# Patient Record
Sex: MALE | Race: WHITE | Employment: UNEMPLOYED | URBAN - METROPOLITAN AREA
[De-identification: names, ages, dates, MRNs, and addresses within clinical notes are randomized per-mention and may not be internally consistent; named-entity substitution may affect disease eponyms.]

---

## 2017-08-24 ENCOUNTER — HOSPITAL ENCOUNTER (EMERGENCY)
Facility: CLINIC | Age: 22
Discharge: HOME | End: 2017-08-25
Attending: EMERGENCY MEDICINE | Admitting: EMERGENCY MEDICINE
Payer: COMMERCIAL

## 2017-08-24 DIAGNOSIS — R19.7 BLOODY DIARRHEA: ICD-10-CM

## 2017-08-24 LAB
ANION GAP SERPL CALCULATED.3IONS-SCNC: 8 MMOL/L (ref 3–14)
BASOPHILS # BLD AUTO: 0 10E9/L (ref 0–0.2)
BASOPHILS NFR BLD AUTO: 0.4 %
BUN SERPL-MCNC: 10 MG/DL (ref 7–30)
CALCIUM SERPL-MCNC: 9.1 MG/DL (ref 8.5–10.1)
CHLORIDE SERPL-SCNC: 103 MMOL/L (ref 94–109)
CO2 SERPL-SCNC: 27 MMOL/L (ref 20–32)
CREAT SERPL-MCNC: 0.69 MG/DL (ref 0.66–1.25)
DIFFERENTIAL METHOD BLD: NORMAL
EOSINOPHIL # BLD AUTO: 0.4 10E9/L (ref 0–0.7)
EOSINOPHIL NFR BLD AUTO: 4.9 %
ERYTHROCYTE [DISTWIDTH] IN BLOOD BY AUTOMATED COUNT: 12 % (ref 10–15)
GFR SERPL CREATININE-BSD FRML MDRD: >90 ML/MIN/1.7M2
GLUCOSE SERPL-MCNC: 80 MG/DL (ref 70–99)
HCT VFR BLD AUTO: 42.6 % (ref 40–53)
HGB BLD-MCNC: 14.9 G/DL (ref 13.3–17.7)
IMM GRANULOCYTES # BLD: 0 10E9/L (ref 0–0.4)
IMM GRANULOCYTES NFR BLD: 0.3 %
LIPASE SERPL-CCNC: 330 U/L (ref 73–393)
LYMPHOCYTES # BLD AUTO: 2.2 10E9/L (ref 0.8–5.3)
LYMPHOCYTES NFR BLD AUTO: 29.7 %
MCH RBC QN AUTO: 30.2 PG (ref 26.5–33)
MCHC RBC AUTO-ENTMCNC: 35 G/DL (ref 31.5–36.5)
MCV RBC AUTO: 86 FL (ref 78–100)
MONOCYTES # BLD AUTO: 1 10E9/L (ref 0–1.3)
MONOCYTES NFR BLD AUTO: 13.1 %
NEUTROPHILS # BLD AUTO: 3.8 10E9/L (ref 1.6–8.3)
NEUTROPHILS NFR BLD AUTO: 51.6 %
NRBC # BLD AUTO: 0 10*3/UL
NRBC BLD AUTO-RTO: 0 /100
PLATELET # BLD AUTO: 198 10E9/L (ref 150–450)
POTASSIUM SERPL-SCNC: 3.7 MMOL/L (ref 3.4–5.3)
RBC # BLD AUTO: 4.94 10E12/L (ref 4.4–5.9)
SODIUM SERPL-SCNC: 138 MMOL/L (ref 133–144)
WBC # BLD AUTO: 7.4 10E9/L (ref 4–11)

## 2017-08-24 PROCEDURE — 83690 ASSAY OF LIPASE: CPT | Performed by: EMERGENCY MEDICINE

## 2017-08-24 PROCEDURE — 99284 EMERGENCY DEPT VISIT MOD MDM: CPT | Mod: 25

## 2017-08-24 PROCEDURE — 85025 COMPLETE CBC W/AUTO DIFF WBC: CPT | Performed by: EMERGENCY MEDICINE

## 2017-08-24 PROCEDURE — 96360 HYDRATION IV INFUSION INIT: CPT

## 2017-08-24 PROCEDURE — 25000128 H RX IP 250 OP 636: Performed by: EMERGENCY MEDICINE

## 2017-08-24 PROCEDURE — 80048 BASIC METABOLIC PNL TOTAL CA: CPT | Performed by: EMERGENCY MEDICINE

## 2017-08-24 RX ORDER — SODIUM CHLORIDE 9 MG/ML
1000 INJECTION, SOLUTION INTRAVENOUS CONTINUOUS
Status: DISCONTINUED | OUTPATIENT
Start: 2017-08-24 | End: 2017-08-25 | Stop reason: HOSPADM

## 2017-08-24 RX ADMIN — SODIUM CHLORIDE 1000 ML: 9 INJECTION, SOLUTION INTRAVENOUS at 23:15

## 2017-08-24 NOTE — ED AVS SNAPSHOT
Emergency Department    [address] [place] [address]    Phone:  [phone]    Fax:  [phone]                                       Awais Best   MRN: [id number]    Department:   Emergency Department   Date of Visit:  8/24/2017           After Visit Summary Signature Page     I have received my discharge instructions, and my questions have been answered. I have discussed any challenges I see with this plan with the nurse or doctor.    ..........................................................................................................................................  Patient/Patient Representative Signature      ..........................................................................................................................................  Patient Representative Print Name and Relationship to Patient    ..................................................               ................................................  Date                                            Time    ..........................................................................................................................................  Reviewed by Signature/Title    ...................................................              ..............................................  Date                                                            Time

## 2017-08-24 NOTE — ED AVS SNAPSHOT
Emergency Department    [address] [facility] [address]    Phone:  [phone]    Fax:  [phone]                                       Awais Best   MRN: [id number]    Department:   Emergency Department   Date of Visit:  8/24/2017           Patient Information     Date Of Birth          1995        Your diagnoses for this visit were:     Bloody diarrhea        You were seen by Harris Benson MD.      Follow-up Information     Follow up with [facility]. Call today.    Specialty:  Pediatrics    Contact information:    [address] [place]  [facility] [address] [phone]          Follow up with  Emergency Department.    Specialty:  EMERGENCY MEDICINE    Why:  As needed    Contact information:    [address] [facility] [address] [phone]        Discharge Instructions       1. Drink plenty of fluids.  2. Please call your clinic tomorrow to check on your culture results.  3. Please follow-up with your primary care doctor as needed.  Please be seen in [facility] next week if symptoms persist.  4. Please return to the ED as needed for new or worsening symptoms such as severe and uncontrollable abdominal pain, vomiting and unable to keep anything down, fainting, chest pain, shortness of breath, any other concerning symptoms.  Please avoid anti-diarrheal medications.    24 Hour Appointment Hotline       To make an appointment at any [facility], call [phone] ([phone]). If you don't have a family doctor or clinic, we will help you find one. [place] clinics are conveniently located to serve the needs of you and your family.             Review of your medicines      Notice     You have not been prescribed any medications.            Procedures and tests performed during your visit     Basic metabolic panel    CBC with platelets differential    Enteric Bacteria and Virus Panel by GERONIMO Stool    Lipase    Ova and  Parasite Exam Routine      Orders Needing Specimen Collection     None      Pending Results     Date and Time Order Name Status Description    8/24/2017 2303 Ova and Parasite Exam Routine In process     8/24/2017 2303 Enteric Bacteria and Virus Panel by GERONIMO Stool In process             Pending Culture Results     Date and Time Order Name Status Description    8/24/2017 2303 Ova and Parasite Exam Routine In process     8/24/2017 2303 Enteric Bacteria and Virus Panel by GERONIMO Stool In process             Pending Results Instructions     If you had any lab results that were not finalized at the time of your Discharge, you can call the ED Lab Result RN at [phone]. You will be contacted by this team for any positive Lab results or changes in treatment. The nurses are available 7 days a week from 10A to 6:30P.  You can leave a message 24 hours per day and they will return your call.        Test Results From Your Hospital Stay        8/24/2017 10:26 PM      Component Results     Component Value Ref Range & Units Status    WBC 7.4 4.0 - 11.0 10e9/L Final    RBC Count 4.94 4.4 - 5.9 10e12/L Final    Hemoglobin 14.9 13.3 - 17.7 g/dL Final    Hematocrit 42.6 40.0 - 53.0 % Final    MCV 86 78 - 100 fl Final    MCH 30.2 26.5 - 33.0 pg Final    MCHC 35.0 31.5 - 36.5 g/dL Final    RDW 12.0 10.0 - 15.0 % Final    Platelet Count 198 150 - 450 10e9/L Final    Diff Method Automated Method  Final    % Neutrophils 51.6 % Final    % Lymphocytes 29.7 % Final    % Monocytes 13.1 % Final    % Eosinophils 4.9 % Final    % Basophils 0.4 % Final    % Immature Granulocytes 0.3 % Final    Nucleated RBCs 0 0 /100 Final    Absolute Neutrophil 3.8 1.6 - 8.3 10e9/L Final    Absolute Lymphocytes 2.2 0.8 - 5.3 10e9/L Final    Absolute Monocytes 1.0 0.0 - 1.3 10e9/L Final    Absolute Eosinophils 0.4 0.0 - 0.7 10e9/L Final    Absolute Basophils 0.0 0.0 - 0.2 10e9/L Final    Abs Immature Granulocytes 0.0 0 - 0.4 10e9/L Final    Absolute Nucleated RBC  0.0  Final         8/24/2017 10:42 PM      Component Results     Component Value Ref Range & Units Status    Lipase 330 73 - 393 U/L Final         8/24/2017 10:42 PM      Component Results     Component Value Ref Range & Units Status    Sodium 138 133 - 144 mmol/L Final    Potassium 3.7 3.4 - 5.3 mmol/L Final    Chloride 103 94 - 109 mmol/L Final    Carbon Dioxide 27 20 - 32 mmol/L Final    Anion Gap 8 3 - 14 mmol/L Final    Glucose 80 70 - 99 mg/dL Final    Urea Nitrogen 10 7 - 30 mg/dL Final    Creatinine 0.69 0.66 - 1.25 mg/dL Final    GFR Estimate >90 >60 mL/min/1.7m2 Final    Non  GFR Calc    GFR Estimate If Black >90 >60 mL/min/1.7m2 Final    African American GFR Calc    Calcium 9.1 8.5 - 10.1 mg/dL Final         8/25/2017 12:29 AM         8/25/2017 12:30 AM                Clinical Quality Measure: Blood Pressure Screening     Your blood pressure was checked while you were in the emergency department today. The last reading we obtained was  BP: 112/71 . Please read the guidelines below about what these numbers mean and what you should do about them.  If your systolic blood pressure (the top number) is less than 120 and your diastolic blood pressure (the bottom number) is less than 80, then your blood pressure is normal. There is nothing more that you need to do about it.  If your systolic blood pressure (the top number) is 120-139 or your diastolic blood pressure (the bottom number) is 80-89, your blood pressure may be higher than it should be. You should have your blood pressure rechecked within a year by a primary care provider.  If your systolic blood pressure (the top number) is 140 or greater or your diastolic blood pressure (the bottom number) is 90 or greater, you may have high blood pressure. High blood pressure is treatable, but if left untreated over time it can put you at risk for heart attack, stroke, or kidney failure. You should have your blood pressure rechecked by a primary care  "provider within the next 4 weeks.  If your provider in the emergency department today gave you specific instructions to follow-up with your doctor or provider even sooner than that, you should follow that instruction and not wait for up to 4 weeks for your follow-up visit.        Thank you for choosing [facility]       Thank you for choosing [facility] for your care. Our goal is always to provide you with excellent care. Hearing back from our patients is one way we can continue to improve our services. Please take a few minutes to complete the written survey that you may receive in the mail after you visit with us. Thank you!        [employer] Information     [employer] lets you send messages to your doctor, view your test results, renew your prescriptions, schedule appointments and more. To sign up, go to [email or web address] . Click on \"Log in\" on the left side of the screen, which will take you to the Welcome page. Then click on \"Sign up Now\" on the right side of the page.     You will be asked to enter the access code listed below, as well as some personal information. Please follow the directions to create your username and password.     Your access code is: [email or web address]  Expires: 2017 12:37 AM     Your access code will  in 90 days. If you need help or a new code, please call your [facility] clinic or [phone].        Care EveryWhere ID     This is your Care EveryWhere ID. This could be used by other organizations to access your [facility] medical records  [id number]        Equal Access to Services     JOLENE LIVINGSTON : Hadii joselyn youngo Sojesse, waaxda luqadaha, qaybta kaalmada sarah garcia . So [facility] [place] [phone].    ATENCIÓN: Si habla español, tiene a salazar disposición servicios gratuitos de asistencia lingüística. Llame al [phone].    We comply with applicable federal civil rights laws and [place] laws. We do not discriminate on the basis of race, color, " national origin, age, disability sex, sexual orientation or gender identity.            After Visit Summary       This is your record. Keep this with you and show to your community pharmacist(s) and doctor(s) at your next visit.

## 2017-08-25 ENCOUNTER — TELEPHONE (OUTPATIENT)
Dept: EMERGENCY MEDICINE | Facility: CLINIC | Age: 22
End: 2017-08-25

## 2017-08-25 VITALS
OXYGEN SATURATION: 97 % | RESPIRATION RATE: 16 BRPM | SYSTOLIC BLOOD PRESSURE: 112 MMHG | HEIGHT: 74 IN | DIASTOLIC BLOOD PRESSURE: 67 MMHG | WEIGHT: 160 LBS | HEART RATE: 70 BPM | TEMPERATURE: 98.5 F | BODY MASS INDEX: 20.53 KG/M2

## 2017-08-25 LAB
C COLI+JEJUNI+LARI FUSA STL QL NAA+PROBE: ABNORMAL
EC STX1 GENE STL QL NAA+PROBE: NOT DETECTED
EC STX2 GENE STL QL NAA+PROBE: NOT DETECTED
ENTERIC PATHOGEN COMMENT: ABNORMAL
NOROV GI+II ORF1-ORF2 JNC STL QL NAA+PR: NOT DETECTED
O+P STL MICRO: NORMAL
RVA NSP5 STL QL NAA+PROBE: NOT DETECTED
SALMONELLA SP RPOD STL QL NAA+PROBE: NOT DETECTED
SHIGELLA SP+EIEC IPAH STL QL NAA+PROBE: NOT DETECTED
SPECIMEN SOURCE: NORMAL
V CHOL+PARA RFBL+TRKH+TNAA STL QL NAA+PR: NOT DETECTED
Y ENTERO RECN STL QL NAA+PROBE: NOT DETECTED

## 2017-08-25 PROCEDURE — 87506 IADNA-DNA/RNA PROBE TQ 6-11: CPT | Performed by: EMERGENCY MEDICINE

## 2017-08-25 PROCEDURE — 87177 OVA AND PARASITES SMEARS: CPT | Performed by: EMERGENCY MEDICINE

## 2017-08-25 PROCEDURE — 87209 SMEAR COMPLEX STAIN: CPT | Performed by: EMERGENCY MEDICINE

## 2017-08-25 NOTE — DISCHARGE INSTRUCTIONS
1. Drink plenty of fluids.  2. Please call your clinic tomorrow to check on your culture results.  3. Please follow-up with your primary care doctor as needed.  Please be seen in [facility] at [facility] next week if symptoms persist.  4. Please return to the ED as needed for new or worsening symptoms such as severe and uncontrollable abdominal pain, vomiting and unable to keep anything down, fainting, chest pain, shortness of breath, any other concerning symptoms.  Please avoid anti-diarrheal medications.

## 2017-08-25 NOTE — ED NOTES
Pt presents to ER for abdominal pain, diarrhea for 5 days. Pt also states he has nausea but no vomiting. Pt just visited [place]. Lungs clear, abdomen soft and tender RUQ, skin warm dry and intact. Aox4. nad noted. Abc intact. 18g inserted to RAC. Blood cultures held, labs sent.

## 2017-08-25 NOTE — ED PROVIDER NOTES
"  History     Chief Complaint:  Bloody stool    HPI   Awais Best is a 22 year old male who presents with concerns for fever and diarrhea. The past month and a half the patient was living in [place]. A week ago the patient began to feel queezy and off before his return flight. During his flight he felt like something was tearing him up inside. This past week he has had a fever (highest 102), diarrhea and body aches/pains in his legs and knees. The past few days, the patient has had 5-6 episodes of diarrhea per day with a notable amount of blood in his stool which prompted his ED visit. He sent multiple stool samples to [facility] pediatrics on Monday (4 days ago) and is awaiting results. His last BM was 1.5 hours prior to evaluation. The patient denies vomiting and is not nauseous in the ED. He has no health problems, no chest pain, no light headedness, no vision changes, no shortness of breath and no allergies. He is currently on a course of doxycycline for malaria.    Allergies:  No Known Drug Allergies      Medications:    The patient is not currently taking any prescribed medications.    Past Medical History:    depression    Past Surgical History:    History reviewed. No pertinent past surgical history.    Family History:    The patient denies any relevant family medical history.    Social History:  Smoking Status: 0.25 pack/day  Smokeless Tobacco: none  Alcohol Use: yes    Marital Status:  Single [1]    Review of Systems   All other systems reviewed and are negative.    Physical Exam   First Vitals:  BP: 128/70  Pulse: 89  Temp: 98.5  F (36.9  C)  Resp: 16  Height: 188 cm (6' 2\")  Weight: 72.6 kg (160 lb)  SpO2: 100 %    Physical Exam  Constitutional: Well developed, nontox appearance  Head: Atraumatic.   Mouth/Throat: Oropharynx is clear and moist.   Neck:  no stridor  Eyes:  no scleral icterus  Cardiovascular: RRR, no m/r/g, 2+ R distal pulses  Pulmonary/Chest: nml resp effort, Clear BS bilat  Abdominal: " ND, +BS, soft, R mid and lower abd tenderness, no rebound or guarding   : no CVA tenderness bilat  Ext: WWP, no edema  Neurological: A&Ox3, symmetric facies, moves ext x4  Skin: Skin is warm and dry.   Psychiatric: Behavior is normal. Thought content normal.   Nursing note and vitals reviewed.    Emergency Department Course   Laboratory:  CBC: o/w WNL. (WBC 7.4, HGB 14.9, )   Lipase: 330  BMP: o/w WNL (Creatinine: 0.69, Glucose: 80)  Enteric Bacteria and Virus Panel by GERONIMO stool: in process  Ova and parasite exam routine: in process    Interventions:  2315 NS, 1 L, IV    Emergency Department Course:  Nursing notes and vitals reviewed. I performed an exam of the patient as documented above.     IV inserted. Medicine administered as documented above. Blood drawn. This was sent to the lab for further testing, results above.    0004 I consulted with EDDIE [facility], regarding the patient's history and presentation here in the emergency department.    Findings and plan explained to the Patient. Patient discharged home with instructions regarding supportive care, medications, and reasons to return. The importance of close follow-up was reviewed.     I personally reviewed the laboratory results with the Patient and answered all related questions prior to discharge.     Impression & Plan    Medical Decision Makin y/o male presenting with bloody diarrhea.     Differential diagnosis includes travelers diarrhea, electrolyte abnormality, anemia. The labs return unremarkable and the patient non-toxic appearing. Given that the patient has pending culture results and appears well, I feel that deferring antibiotics at this time given risk of E. Coli and complications such as HUS would be the most reasonable route. I also discussed with on-call infectious disease who agreed with the plan. The patient is advised to stay hydrated and follow up with his PCP for culture results. Recommendations also given regarding follow  up in an ED as needed. The patient demonstrated understanding and is agreeable to plan. Discharged in stable condition.    Diagnosis:    ICD-10-CM    1. Bloody diarrhea R19.7 Ova and Parasite Exam Routine       Disposition:  discharged to home    ITariq, am serving as a scribe on 8/24/2017 at 10:59 PM to personally document services performed by Harris Benson MD based on my observations and the provider's statements to me.       Tariq Nieves  8/24/2017    EMERGENCY DEPARTMENT       Harris Benson MD  08/25/17 [id number]

## 2017-08-25 NOTE — TELEPHONE ENCOUNTER
[facility] Emergency Department Lab result notification [Adult-Male]    [facility] ED lab result protocol used  Enteric bacteria and virus / Campylobacter protocol    Reason for call  Notify of lab results, assess symptoms,  review ED providers recommendations/discharge instructions (if necessary) and advise per ED lab result f/u protocol    Lab Result (including Rx patient on, if applicable)  Final Enteric Bacteria and Virus Panel by GERONIMO Stool is POSITIVE for Campylobacter  Patient to be notified, symptom's assessed and advised per [facility] ED lab result f/u protocol.    Information table from ED Provider visit on 8/24/17  ED diagnosis:   Bloody diarrhea      ED provider Harris Benson MD   Symptoms reported at ED visit (Chief complaint, HPI) Awais Best is a 22 year old male who presents with concerns for fever and diarrhea. The past month and a half the patient was living in [facility]. A week ago the patient began to feel queezy and off before his return flight. During his flight he felt like something was tearing him up inside. This past week he has had a fever (highest 102), diarrhea and body aches/pains in his legs and knees. The past few days, the patient has had 5-6 episodes of diarrhea per day with a notable amount of blood in his stool which prompted his ED visit. He sent multiple stool samples to [facility] pediatrics on Monday (4 days ago) and is awaiting results. His last BM was 1.5 hours prior to evaluation. The patient denies vomiting and is not nauseous in the ED. He has no health problems, no chest pain, no light headedness, no vision changes, no shortness of breath and no allergies. He is currently on a course of doxycycline for malaria.   ED providers Impression and Plan (applicable information) Differential diagnosis includes travelers diarrhea, electrolyte abnormality, anemia. The labs return unremarkable and the patient non-toxic appearing. Given that the patient has  "pending culture results and appears well, I feel that differing antibiotics at this time given risk of E. Coli and complications such as HUS would be the most reasonable route. I also discussed with on-call infectious disease who agreed with the plan. The patient is advised to stay hydrated and follow up with his PCP for culture results. Recommendation is also given regarding follow up in an ED as needed. The patient demonstrated understanding and is agreeable to plan. Discharged in stable condition.   Significant Medical hx, if applicable None   Coumadin/Warfarin [Yes or No] No   Creatinine Level (mg/dl) 0.69   Creatinine clearance (ml/min), if applicable 174.7   Allergies NKA   Weight, if applicable 72.6 Kg      RN Assessment (Patient s current Symptoms), include time called.  [Insert Left message here if message left]  Jack today (day 6 of illness) feeling \"a little better\".  Stomach pain remains stable, not constant and not worse.  No new symptoms to report.  Continues on Doxycycline for malaria for an additional 19 days.  [facility] peds not yet with stool sample results from [facility] 5 days ago.  Faxed results to PCP per patient request.  Did review general information about Campylobacter.      Please Contact your PCP clinic or return to the Emergency department if your:    Symptoms worsen or other concerning symptom's.    PCP follow-up Questions asked: yes    Magalys Sanchez RN    [facility] RN  Lung Nodule and ED Lab Results F/U RN  Epic pool (ED late result f/u RN) : [phone]  Ph # [phone]     Copy of Lab result   Order   Enteric Bacteria and Virus Panel by GERONIMO Stool [[id number]] (Order [id number])   Exam Information   Exam Date Exam Time Accession # Results    8/25/17 12:10 AM [id number]    Component Results   Component Value Flag Ref Range Units Status Collected Lab   Campylobacter group by GERONIMO  (A) NDET^Not Detected  Final 08/25/2017 12:10 AM 75   Detected, Abnormal Result   Salmonella species " by GERONIMO Not Detected  NDET^Not Detected  Final 08/25/2017 12:10 AM 75   Shigella species by GERONIMO Not Detected  NDET^Not Detected  Final 08/25/2017 12:10 AM 75   Vibrio group by GERONIMO Not Detected  NDET^Not Detected  Final 08/25/2017 12:10 AM 75   Rotavirus A by GERONIMO Not Detected  NDET^Not Detected  Final 08/25/2017 12:10 AM 75   Shiga toxin 1 gene by GERONIMO Not Detected  NDET^Not Detected  Final 08/25/2017 12:10 AM 75   Shiga toxin 2 gene by GERONIMO Not Detected  NDET^Not Detected  Final 08/25/2017 12:10 AM 75   Norovirus I and II by GERONIMO Not Detected  NDET^Not Detected  Final 08/25/2017 12:10 AM 75   Yersinia enterocolitica by GERONIMO Not Detected  NDET^Not Detected  Final 08/25/2017 12:10 AM 75

## 2017-12-21 ENCOUNTER — HOSPITAL ENCOUNTER (EMERGENCY)
Facility: CLINIC | Age: 22
Discharge: HOME | End: 2017-12-21
Attending: EMERGENCY MEDICINE | Admitting: EMERGENCY MEDICINE
Payer: COMMERCIAL

## 2017-12-21 ENCOUNTER — APPOINTMENT (OUTPATIENT)
Dept: CT IMAGING | Facility: CLINIC | Age: 22
End: 2017-12-21
Attending: EMERGENCY MEDICINE
Payer: COMMERCIAL

## 2017-12-21 VITALS
TEMPERATURE: 98.1 F | DIASTOLIC BLOOD PRESSURE: 56 MMHG | SYSTOLIC BLOOD PRESSURE: 104 MMHG | RESPIRATION RATE: 20 BRPM | WEIGHT: 150 LBS | HEIGHT: 74 IN | BODY MASS INDEX: 19.25 KG/M2 | OXYGEN SATURATION: 97 %

## 2017-12-21 DIAGNOSIS — F10.939 ALCOHOL WITHDRAWAL SYNDROME WITH COMPLICATION (H): ICD-10-CM

## 2017-12-21 DIAGNOSIS — R56.9 SEIZURES (H): ICD-10-CM

## 2017-12-21 LAB
ALBUMIN SERPL-MCNC: 4.6 G/DL (ref 3.4–5)
ALBUMIN UR-MCNC: 30 MG/DL
ALP SERPL-CCNC: 82 U/L (ref 40–150)
ALT SERPL W P-5'-P-CCNC: 40 U/L (ref 0–70)
AMPHETAMINES UR QL SCN: NEGATIVE
ANION GAP SERPL CALCULATED.3IONS-SCNC: 17 MMOL/L (ref 3–14)
APPEARANCE UR: CLEAR
AST SERPL W P-5'-P-CCNC: 26 U/L (ref 0–45)
BARBITURATES UR QL: NEGATIVE
BASOPHILS # BLD AUTO: 0.1 10E9/L (ref 0–0.2)
BASOPHILS NFR BLD AUTO: 0.7 %
BENZODIAZ UR QL: NEGATIVE
BILIRUB SERPL-MCNC: 0.6 MG/DL (ref 0.2–1.3)
BILIRUB UR QL STRIP: NEGATIVE
BUN SERPL-MCNC: 7 MG/DL (ref 7–30)
CALCIUM SERPL-MCNC: 9.4 MG/DL (ref 8.5–10.1)
CANNABINOIDS UR QL SCN: POSITIVE
CHLORIDE SERPL-SCNC: 107 MMOL/L (ref 94–109)
CO2 SERPL-SCNC: 15 MMOL/L (ref 20–32)
COCAINE UR QL: NEGATIVE
COLOR UR AUTO: YELLOW
CREAT SERPL-MCNC: 0.96 MG/DL (ref 0.66–1.25)
DIFFERENTIAL METHOD BLD: NORMAL
EOSINOPHIL # BLD AUTO: 0.1 10E9/L (ref 0–0.7)
EOSINOPHIL NFR BLD AUTO: 0.7 %
ERYTHROCYTE [DISTWIDTH] IN BLOOD BY AUTOMATED COUNT: 12.4 % (ref 10–15)
ETHANOL SERPL-MCNC: <0.01 G/DL
GFR SERPL CREATININE-BSD FRML MDRD: >90 ML/MIN/1.7M2
GLUCOSE SERPL-MCNC: 220 MG/DL (ref 70–99)
GLUCOSE UR STRIP-MCNC: 300 MG/DL
HCT VFR BLD AUTO: 49.6 % (ref 40–53)
HGB BLD-MCNC: 17.1 G/DL (ref 13.3–17.7)
HGB UR QL STRIP: ABNORMAL
HYALINE CASTS #/AREA URNS LPF: 5 /LPF (ref 0–2)
IMM GRANULOCYTES # BLD: 0.1 10E9/L (ref 0–0.4)
IMM GRANULOCYTES NFR BLD: 1.3 %
INTERPRETATION ECG - MUSE: NORMAL
KETONES UR STRIP-MCNC: 80 MG/DL
LEUKOCYTE ESTERASE UR QL STRIP: NEGATIVE
LIPASE SERPL-CCNC: 108 U/L (ref 73–393)
LYMPHOCYTES # BLD AUTO: 2.8 10E9/L (ref 0.8–5.3)
LYMPHOCYTES NFR BLD AUTO: 30.8 %
MAGNESIUM SERPL-MCNC: 2.8 MG/DL (ref 1.6–2.3)
MCH RBC QN AUTO: 30.5 PG (ref 26.5–33)
MCHC RBC AUTO-ENTMCNC: 34.5 G/DL (ref 31.5–36.5)
MCV RBC AUTO: 89 FL (ref 78–100)
MONOCYTES # BLD AUTO: 1.3 10E9/L (ref 0–1.3)
MONOCYTES NFR BLD AUTO: 14.2 %
MUCOUS THREADS #/AREA URNS LPF: PRESENT /LPF
NEUTROPHILS # BLD AUTO: 4.8 10E9/L (ref 1.6–8.3)
NEUTROPHILS NFR BLD AUTO: 52.3 %
NITRATE UR QL: NEGATIVE
NRBC # BLD AUTO: 0 10*3/UL
NRBC BLD AUTO-RTO: 0 /100
OPIATES UR QL SCN: NEGATIVE
PCP UR QL SCN: NEGATIVE
PH UR STRIP: 6.5 PH (ref 5–7)
PLATELET # BLD AUTO: 246 10E9/L (ref 150–450)
POTASSIUM SERPL-SCNC: 3.6 MMOL/L (ref 3.4–5.3)
PROT SERPL-MCNC: 8.2 G/DL (ref 6.8–8.8)
RBC # BLD AUTO: 5.6 10E12/L (ref 4.4–5.9)
RBC #/AREA URNS AUTO: 1 /HPF (ref 0–2)
SODIUM SERPL-SCNC: 139 MMOL/L (ref 133–144)
SOURCE: ABNORMAL
SP GR UR STRIP: 1.01 (ref 1–1.03)
SPERM #/AREA URNS HPF: PRESENT /HPF
UROBILINOGEN UR STRIP-MCNC: NORMAL MG/DL (ref 0–2)
WBC # BLD AUTO: 9.2 10E9/L (ref 4–11)
WBC #/AREA URNS AUTO: <1 /HPF (ref 0–2)

## 2017-12-21 PROCEDURE — 25000128 H RX IP 250 OP 636: Performed by: EMERGENCY MEDICINE

## 2017-12-21 PROCEDURE — 96361 HYDRATE IV INFUSION ADD-ON: CPT

## 2017-12-21 PROCEDURE — 93005 ELECTROCARDIOGRAM TRACING: CPT

## 2017-12-21 PROCEDURE — 80053 COMPREHEN METABOLIC PANEL: CPT | Performed by: EMERGENCY MEDICINE

## 2017-12-21 PROCEDURE — 83735 ASSAY OF MAGNESIUM: CPT | Performed by: EMERGENCY MEDICINE

## 2017-12-21 PROCEDURE — 83690 ASSAY OF LIPASE: CPT | Performed by: EMERGENCY MEDICINE

## 2017-12-21 PROCEDURE — 70450 CT HEAD/BRAIN W/O DYE: CPT

## 2017-12-21 PROCEDURE — 99285 EMERGENCY DEPT VISIT HI MDM: CPT | Mod: 25

## 2017-12-21 PROCEDURE — 80320 DRUG SCREEN QUANTALCOHOLS: CPT | Performed by: EMERGENCY MEDICINE

## 2017-12-21 PROCEDURE — 81001 URINALYSIS AUTO W/SCOPE: CPT | Mod: XU | Performed by: EMERGENCY MEDICINE

## 2017-12-21 PROCEDURE — 96375 TX/PRO/DX INJ NEW DRUG ADDON: CPT

## 2017-12-21 PROCEDURE — 85025 COMPLETE CBC W/AUTO DIFF WBC: CPT | Performed by: EMERGENCY MEDICINE

## 2017-12-21 PROCEDURE — 96374 THER/PROPH/DIAG INJ IV PUSH: CPT

## 2017-12-21 PROCEDURE — 80307 DRUG TEST PRSMV CHEM ANLYZR: CPT | Performed by: EMERGENCY MEDICINE

## 2017-12-21 RX ORDER — LEVETIRACETAM 500 MG/1
1000 TABLET ORAL 2 TIMES DAILY
Qty: 60 TABLET | Refills: 0 | Status: SHIPPED | OUTPATIENT
Start: 2017-12-21

## 2017-12-21 RX ORDER — LORAZEPAM 2 MG/ML
1 INJECTION INTRAMUSCULAR ONCE
Status: COMPLETED | OUTPATIENT
Start: 2017-12-21 | End: 2017-12-21

## 2017-12-21 RX ORDER — ONDANSETRON 2 MG/ML
4 INJECTION INTRAMUSCULAR; INTRAVENOUS ONCE
Status: COMPLETED | OUTPATIENT
Start: 2017-12-21 | End: 2017-12-21

## 2017-12-21 RX ORDER — ONDANSETRON 2 MG/ML
4 INJECTION INTRAMUSCULAR; INTRAVENOUS EVERY 30 MIN PRN
Status: DISCONTINUED | OUTPATIENT
Start: 2017-12-21 | End: 2017-12-21

## 2017-12-21 RX ORDER — SODIUM CHLORIDE 9 MG/ML
1000 INJECTION, SOLUTION INTRAVENOUS CONTINUOUS
Status: DISCONTINUED | OUTPATIENT
Start: 2017-12-21 | End: 2017-12-21 | Stop reason: HOSPADM

## 2017-12-21 RX ORDER — LEVETIRACETAM 5 MG/ML
500 INJECTION INTRAVASCULAR ONCE
Status: COMPLETED | OUTPATIENT
Start: 2017-12-21 | End: 2017-12-21

## 2017-12-21 RX ADMIN — LEVETIRACETAM 500 MG: 5 INJECTION INTRAVENOUS at 20:15

## 2017-12-21 RX ADMIN — LORAZEPAM 1 MG: 2 INJECTION INTRAMUSCULAR; INTRAVENOUS at 18:45

## 2017-12-21 RX ADMIN — SODIUM CHLORIDE 1000 ML: 9 INJECTION, SOLUTION INTRAVENOUS at 18:28

## 2017-12-21 RX ADMIN — ONDANSETRON 4 MG: 2 INJECTION INTRAMUSCULAR; INTRAVENOUS at 18:29

## 2017-12-21 ASSESSMENT — ENCOUNTER SYMPTOMS
VOMITING: 1
NECK PAIN: 0
ABDOMINAL PAIN: 0
NAUSEA: 1
HEADACHES: 1
CONFUSION: 1
DIARRHEA: 0

## 2017-12-21 NOTE — ED AVS SNAPSHOT
Emergency Department    [address] [place] [address]    Phone:  [phone]    Fax:  [phone]                                       Awais Best   MRN: [id number]    Department:   Emergency Department   Date of Visit:  12/21/2017           After Visit Summary Signature Page     I have received my discharge instructions, and my questions have been answered. I have discussed any challenges I see with this plan with the nurse or doctor.    ..........................................................................................................................................  Patient/Patient Representative Signature      ..........................................................................................................................................  Patient Representative Print Name and Relationship to Patient    ..................................................               ................................................  Date                                            Time    ..........................................................................................................................................  Reviewed by Signature/Title    ...................................................              ..............................................  Date                                                            Time

## 2017-12-21 NOTE — ED AVS SNAPSHOT
Emergency Department    [address] [facility] [address]    Phone:  [phone]    Fax:  [phone]                                       Awais Bset   MRN: [id number]    Department:   Emergency Department   Date of Visit:  12/21/2017           Patient Information     Date Of Birth          1995        Your diagnoses for this visit were:     Seizures (H)     Possible alcohol withdrawal syndrome with complication (H)        You were seen by Armando Collins MD.      Follow-up Information     Schedule an appointment as soon as possible for a visit with Joel Marmolejo MD.    Specialty:  Neurology    Contact information:    [facility] NEUROLOG[facility]  [facility] [facility] [id number] [phone]          Follow up with  Emergency Department.    Specialty:  EMERGENCY MEDICINE    Why:  If symptoms worsen    Contact information:    [address] [facility] [id number] [phone]        Discharge Instructions       Discharge Instructions  Recurrent Seizure (Convulsion)    You were seen today for a seizure. The most common reason for a recurrent seizure is having missed a dose of your medication or taken it at a different time than normal. Other things that increase the risk of seizures include fever, sleep deprivation, alcohol, and stress. Although anti-seizure medications (anti-epileptic drugs) work for many people with seizure disorders, some people continue to have seizures even after trying several medications.    Generally, every Emergency Department visit should have a follow-up clinic visit with either a primary or a specialty clinic/provider. Please follow-up as instructed by your emergency provider today.    Return to the Emergency Department if:     You develop a fever over 100.4 F.    You feel much more ill, or develop new symptoms like severe headache.    You have trouble walking, seeing, or develop weakness or numbness in your arms or  legs.     What can I do to help myself?    Take your medication exactly as directed, at the right times, and the right doses.     If you develop uncomfortable side effects, do not stop taking your anti-seizure medication without first speaking to your provider.     Do not let your prescription run out. Stopping anti-seizure medication abruptly can put you at risk of a seizure.    While taking an anti-seizure medication, do not start taking any other medications including over-the-counter medications and herbal supplements without first checking with your provider because mixing them can be dangerous.    Do not drive until you have been rechecked by your provider and have been told it is safe to drive.  If you have a seizure while driving you may cause a motor vehicle accident with injury or death to yourself or others.     Do not swim, climb ladders, or do anything else that would be dangerous if you had another seizure or spell of loss of consciousness, until you are cleared by your provider.      Check your state driving requirements for patients with seizures on the Epilepsy Foundation Website at www.epilepsyfoundation.org/resources/drivingandtravel.cfm.    Do not drink alcohol.  Drinking alcohol increases the risk of seizures and can interfere with the effect of anti-seizure medications.    Start a seizure calendar to record any seizure triggers, such as days when you were sleep-deprived, stressed, drank alcohol, or (if you are a woman) had your period.    Remember, if one medication does not work for you, either because you cannot tolerate the side effects or because you continue to have seizures, your provider can suggest alternate medications or alternate methods of taking the medication.  If you were given a prescription for medicine here today, be sure to read all of the information (including the package insert) that comes with your prescription.  This will include important information about the medicine,  its side effects, and any warnings that you need to know about.  The pharmacist who fills the prescription can provide more information and answer questions you may have about the medicine.  If you have questions or concerns that the pharmacist cannot address, please call or return to the Emergency Department.   Remember that you can always come back to the Emergency Department if you are not able to see your regular provider in the amount of time listed above, if you get any new symptoms, or if there is anything that worries you.          Alcohol Withdrawal  Alcohol withdrawal usually begins after prolonged heavy drinking, and then you suddenly stop drinking, or cut down on your alcohol use. It is not one thing, but is a complex combination of signs and symptoms that generally occur to together and define a particular problem or condition.    Alcohol withdrawal is potentially life-threatening, and is a medical emergency.    It can start as early as a couple of hours after your last drink, or may take 1 to 3 days to develop.    It can last from days to a week or more.    It can worsen very quickly.  Signs and symptoms  There are several stages of alcohol withdrawal, although they overlap, as do their signs and symptoms. In the earlier stages, it most commonly includes:    Anxiety    Shakiness    Nausea and vomiting    Sweating    Insomnia    Headaches    Fever    Mood swings, irritability, agitation, restlessness  Delirium tremens (DTs)  DTs are a severe and life-threatening complication. If it happens, it usually begins about 3 to 5 days after your last drink. It is potentially life threatening. DTs are characterized by:    Sudden and severe mental or nervous system changes    Uncontrollable tremors    Severe disorientation, confusion, hallucinations    Heart racing, or irregular heartbeat    High blood pressure    Seizures    Possible coma and death  Home care    You will need plenty of rest and fluids over the  next several days. Eat regular meals and drink plenty of fluids to prevent dehydration. Do not drink any more alcohol. During this time, it is best that you stay with family or friends who can help and support you. You can also admit yourself to a residential detox program.    Do not drive until all symptoms are gone and you are feeling better. if you've had a seizure, don't drive until you have been examined by a doctor.    If you were given sedative medication to reduce your symptoms, do not take it more often than prescribed and never take it with alcohol.  Follow-up care  Once you have gone through the withdrawal symptoms, you have fought half of the grewal. To avoid the risk of returning to your previous drinking pattern, it is essential that you get follow-up support and treatment.    Alcoholics Anonymous offers support through a self-help fellowship. There are no dues or fees. See the Yellow Pages and call for meeting times and places. www.aa.org    [facility] offers support to families of alcohol users. 575.382.5618 www.al-anon.org    National [place] On Alcoholism And Drug Dependence 285-530-2682 www.ncadd.org    Residential alcohol detox programs are available. Check the Yellow Pages under Drug Abuse & Treatment Centers.  Call 911  Call 911 if any of these occur:    Seizure    Trouble breathing or slow, irregular breathing    Chest pain    Sudden weakness on one side of the body or sudden trouble speaking    Heavy bleeding or vomiting blood    Very drowsy or trouble awakening    Fainting or loss of consciousness    Rapid heart rate  When to seek medical advice  Call your healthcare provider right away if any of these occur:    Severe shakiness    Hallucinations    Fever over 100.4  F (38.0  C)    Headache, confusion, extreme drowsiness, inability to awaken    Increasing upper abdominal pain    Repeated vomiting  Date Last Reviewed: 1/11/2016 2000-2017 [employer]. [address] [facility],  [place] [address]. All rights reserved. This information is not intended as a substitute for professional medical care. Always follow your healthcare professional's instructions.          24 Hour Appointment Hotline       To make an appointment at any [facility], call [phone] ([phone]). If you don't have a family doctor or clinic, we will help you find one. [facility] clinics are conveniently located to serve the needs of you and your family.             Review of your medicines      CONTINUE these medicines which may have CHANGED, or have new prescriptions. If we are uncertain of the size of tablets/capsules you have at home, strength may be listed as something that might have changed.        Dose / Directions Last dose taken    * KEPPRA PO   What changed:  Another medication with the same name was added. Make sure you understand how and when to take each.        Refills:  0        * levETIRAcetam 500 MG tablet   Commonly known as:  KEPPRA   Dose:  1000 mg   What changed:  You were already taking a medication with the same name, and this prescription was added. Make sure you understand how and when to take each.   Quantity:  60 tablet        Take 2 tablets (1,000 mg) by mouth 2 times daily   Refills:  0        * Notice:  This list has 2 medication(s) that are the same as other medications prescribed for you. Read the directions carefully, and ask your doctor or other care provider to review them with you.            Prescriptions were sent or printed at these locations (1 Prescription)                   Other Prescriptions                Printed at Department/Unit printer (1 of 1)         levETIRAcetam (KEPPRA) 500 MG tablet                Procedures and tests performed during your visit     Procedure/Test Number of Times Performed    Alcohol ethyl 1    CBC with platelets differential 1    CT Head w/o Contrast 1    Comprehensive metabolic panel 1    Drug abuse screen 77 urine ([place],RH,SH) 1    EKG  12-lead, tracing only 1    Lipase 1    Magnesium 1    Peripheral IV catheter 2    UA with Microscopic 1      Orders Needing Specimen Collection     None      Pending Results     No orders found from 12/19/2017 to 12/22/2017.            Pending Culture Results     No orders found from 12/19/2017 to 12/22/2017.            Pending Results Instructions     If you had any lab results that were not finalized at the time of your Discharge, you can call the ED Lab Result RN at [phone]. You will be contacted by this team for any positive Lab results or changes in treatment. The nurses are available 7 days a week from 10A to 6:30P.  You can leave a message 24 hours per day and they will return your call.        Test Results From Your Hospital Stay        12/21/2017  6:42 PM      Component Results     Component Value Ref Range & Units Status    WBC 9.2 4.0 - 11.0 10e9/L Final    RBC Count 5.60 4.4 - 5.9 10e12/L Final    Hemoglobin 17.1 13.3 - 17.7 g/dL Final    Hematocrit 49.6 40.0 - 53.0 % Final    MCV 89 78 - 100 fl Final    MCH 30.5 26.5 - 33.0 pg Final    MCHC 34.5 31.5 - 36.5 g/dL Final    RDW 12.4 10.0 - 15.0 % Final    Platelet Count 246 150 - 450 10e9/L Final    Diff Method Automated Method  Final    % Neutrophils 52.3 % Final    % Lymphocytes 30.8 % Final    % Monocytes 14.2 % Final    % Eosinophils 0.7 % Final    % Basophils 0.7 % Final    % Immature Granulocytes 1.3 % Final    Nucleated RBCs 0 0 /100 Final    Absolute Neutrophil 4.8 1.6 - 8.3 10e9/L Final    Absolute Lymphocytes 2.8 0.8 - 5.3 10e9/L Final    Absolute Monocytes 1.3 0.0 - 1.3 10e9/L Final    Absolute Eosinophils 0.1 0.0 - 0.7 10e9/L Final    Absolute Basophils 0.1 0.0 - 0.2 10e9/L Final    Abs Immature Granulocytes 0.1 0 - 0.4 10e9/L Final    Absolute Nucleated RBC 0.0  Final         12/21/2017  7:10 PM      Component Results     Component Value Ref Range & Units Status    Sodium 139 133 - 144 mmol/L Final    Potassium 3.6 3.4 - 5.3 mmol/L Final     Chloride 107 94 - 109 mmol/L Final    Carbon Dioxide 15 (L) 20 - 32 mmol/L Final    Anion Gap 17 (H) 3 - 14 mmol/L Final    Glucose 220 (H) 70 - 99 mg/dL Final    Urea Nitrogen 7 7 - 30 mg/dL Final    Creatinine 0.96 0.66 - 1.25 mg/dL Final    GFR Estimate >90 >60 mL/min/1.7m2 Final    Non  GFR Calc    GFR Estimate If Black >90 >60 mL/min/1.7m2 Final    African American GFR Calc    Calcium 9.4 8.5 - 10.1 mg/dL Final    Bilirubin Total 0.6 0.2 - 1.3 mg/dL Final    Albumin 4.6 3.4 - 5.0 g/dL Final    Protein Total 8.2 6.8 - 8.8 g/dL Final    Alkaline Phosphatase 82 40 - 150 U/L Final    ALT 40 0 - 70 U/L Final    AST 26 0 - 45 U/L Final         12/21/2017  7:10 PM      Component Results     Component Value Ref Range & Units Status    Ethanol g/dL <0.01 <0.01 g/dL Final         12/21/2017  7:10 PM      Component Results     Component Value Ref Range & Units Status    Lipase 108 73 - 393 U/L Final         12/21/2017  7:48 PM      Component Results     Component Value Ref Range & Units Status    Color Urine Yellow  Final    Appearance Urine Clear  Final    Glucose Urine 300 (A) NEG^Negative mg/dL Final    Bilirubin Urine Negative NEG^Negative Final    Ketones Urine 80 (A) NEG^Negative mg/dL Final    Specific Gravity Urine 1.012 1.003 - 1.035 Final    Blood Urine Trace (A) NEG^Negative Final    pH Urine 6.5 5.0 - 7.0 pH Final    Protein Albumin Urine 30 (A) NEG^Negative mg/dL Final    Urobilinogen mg/dL Normal 0.0 - 2.0 mg/dL Final    Nitrite Urine Negative NEG^Negative Final    Leukocyte Esterase Urine Negative NEG^Negative Final    Source Clean catch urine  Final    WBC Urine <1 0 - 2 /HPF Final    RBC Urine 1 0 - 2 /HPF Final    Mucous Urine Present (A) NEG^Negative /LPF Final    sperm Present (A) NEG^Negative /HPF Final    Hyaline Casts 5 (H) 0 - 2 /LPF Final         12/21/2017  7:47 PM      Component Results     Component Value Ref Range & Units Status    Amphetamine Qual Urine Negative  NEG^Negative Final    Cutoff for a negative amphetamine is 500 ng/mL or less.    Barbiturates Qual Urine Negative NEG^Negative Final    Cutoff for a negative barbiturate is 200 ng/mL or less.    Benzodiazepine Qual Urine Negative NEG^Negative Final    Cutoff for a negative benzodiazepine is 200 ng/mL or less.    Cannabinoids Qual Urine Positive (A) NEG^Negative Final    Cutoff for a positive cannabinoid is greater than 50 ng/mL. This is an   unconfirmed screening result to be used for medical purposes only.      Cocaine Qual Urine Negative NEG^Negative Final    Cutoff for a negative cocaine is 300 ng/mL or less.    Opiates Qualitative Urine Negative NEG^Negative Final    Cutoff for a negative opiate is 300 ng/mL or less.    PCP Qual Urine Negative NEG^Negative Final    Cutoff for a negative PCP is 25 ng/mL or less.         12/21/2017  7:10 PM      Component Results     Component Value Ref Range & Units Status    Magnesium 2.8 (H) 1.6 - 2.3 mg/dL Final         12/21/2017  6:55 PM      Narrative     CT SCAN OF THE HEAD WITHOUT CONTRAST   12/21/2017 6:53 PM     HISTORY: Seizure, head trauma. Headache.    TECHNIQUE:  Axial images of the head and coronal reformations without  IV contrast material. Radiation dose for this scan was reduced using  automated exposure control, adjustment of the mA and/or kV according  to patient size, or iterative reconstruction technique.    COMPARISON: 12/28/2016    FINDINGS:  The ventricles are normal in size, shape and configuration.   The brain parenchyma and subarachnoid spaces are normal. There is no  evidence of intracranial hemorrhage, mass, acute infarct or anomaly.     The visualized portions of the sinuses and mastoids appear normal.  There is no evidence of trauma.        Impression     IMPRESSION: Normal CT scan of the head.  No change.      PATTI BENOIT MD                Clinical Quality Measure: Blood Pressure Screening     Your blood pressure was checked while you were in  "the emergency department today. The last reading we obtained was  BP: 123/66 . Please read the guidelines below about what these numbers mean and what you should do about them.  If your systolic blood pressure (the top number) is less than 120 and your diastolic blood pressure (the bottom number) is less than 80, then your blood pressure is normal. There is nothing more that you need to do about it.  If your systolic blood pressure (the top number) is 120-139 or your diastolic blood pressure (the bottom number) is 80-89, your blood pressure may be higher than it should be. You should have your blood pressure rechecked within a year by a primary care provider.  If your systolic blood pressure (the top number) is 140 or greater or your diastolic blood pressure (the bottom number) is 90 or greater, you may have high blood pressure. High blood pressure is treatable, but if left untreated over time it can put you at risk for heart attack, stroke, or kidney failure. You should have your blood pressure rechecked by a primary care provider within the next 4 weeks.  If your provider in the emergency department today gave you specific instructions to follow-up with your doctor or provider even sooner than that, you should follow that instruction and not wait for up to 4 weeks for your follow-up visit.        Thank you for choosing [facility]       Thank you for choosing [facility] for your care. Our goal is always to provide you with excellent care. Hearing back from our patients is one way we can continue to improve our services. Please take a few minutes to complete the written survey that you may receive in the mail after you visit with us. Thank you!        [employer] Information     [employer] lets you send messages to your doctor, view your test results, renew your prescriptions, schedule appointments and more. To sign up, go to [email or web address] . Click on \"Log in\" on the left side of the screen, which will take you to " "the Welcome page. Then click on \"Sign up Now\" on the right side of the page.     You will be asked to enter the access code listed below, as well as some personal information. Please follow the directions to create your username and password.     Your access code is: [email or web address]  Expires: 3/21/2018  9:03 PM     Your access code will  in 90 days. If you need help or a new code, please call your [facility] clinic or [phone].        Care EveryWhere ID     This is your Care EveryWhere ID. This could be used by other organizations to access your [facility] medical records  [id number]        Equal Access to Services     JOLENE LIVINGSTON : Aissatou Chatman, sheridan gonzalez, lian garcia, sarah potts. So [facility] [phone].    ATENCIÓN: Si habla español, tiene a salazar disposición servicios gratuitos de asistencia lingüística. Llame al [phone].    We comply with applicable federal civil rights laws and [place] laws. We do not discriminate on the basis of race, color, national origin, age, disability, sex, sexual orientation, or gender identity.            After Visit Summary       This is your record. Keep this with you and show to your community pharmacist(s) and doctor(s) at your next visit.                  "

## 2017-12-22 NOTE — ED NOTES
Bed: ED28  Expected date:   Expected time:   Means of arrival:   Comments:  Wanda 1 Possible ingestion unknown drug Seizure 20  Male  10 min out

## 2017-12-22 NOTE — DISCHARGE INSTRUCTIONS
Discharge Instructions  Recurrent Seizure (Convulsion)    You were seen today for a seizure. The most common reason for a recurrent seizure is having missed a dose of your medication or taken it at a different time than normal. Other things that increase the risk of seizures include fever, sleep deprivation, alcohol, and stress. Although anti-seizure medications (anti-epileptic drugs) work for many people with seizure disorders, some people continue to have seizures even after trying several medications.    Generally, every Emergency Department visit should have a follow-up clinic visit with either a primary or a specialty clinic/provider. Please follow-up as instructed by your emergency provider today.    Return to the Emergency Department if:     You develop a fever over 100.4 F.    You feel much more ill, or develop new symptoms like severe headache.    You have trouble walking, seeing, or develop weakness or numbness in your arms or legs.     What can I do to help myself?    Take your medication exactly as directed, at the right times, and the right doses.     If you develop uncomfortable side effects, do not stop taking your anti-seizure medication without first speaking to your provider.     Do not let your prescription run out. Stopping anti-seizure medication abruptly can put you at risk of a seizure.    While taking an anti-seizure medication, do not start taking any other medications including over-the-counter medications and herbal supplements without first checking with your provider because mixing them can be dangerous.    Do not drive until you have been rechecked by your provider and have been told it is safe to drive.  If you have a seizure while driving you may cause a motor vehicle accident with injury or death to yourself or others.     Do not swim, climb ladders, or do anything else that would be dangerous if you had another seizure or spell of loss of consciousness, until you are cleared by your  provider.      Check your state driving requirements for patients with seizures on the Epilepsy Foundation Website at www.epilepsyfoundation.org/resources/drivingandtravel.cfm.    Do not drink alcohol.  Drinking alcohol increases the risk of seizures and can interfere with the effect of anti-seizure medications.    Start a seizure calendar to record any seizure triggers, such as days when you were sleep-deprived, stressed, drank alcohol, or (if you are a woman) had your period.    Remember, if one medication does not work for you, either because you cannot tolerate the side effects or because you continue to have seizures, your provider can suggest alternate medications or alternate methods of taking the medication.  If you were given a prescription for medicine here today, be sure to read all of the information (including the package insert) that comes with your prescription.  This will include important information about the medicine, its side effects, and any warnings that you need to know about.  The pharmacist who fills the prescription can provide more information and answer questions you may have about the medicine.  If you have questions or concerns that the pharmacist cannot address, please call or return to the Emergency Department.   Remember that you can always come back to the Emergency Department if you are not able to see your regular provider in the amount of time listed above, if you get any new symptoms, or if there is anything that worries you.          Alcohol Withdrawal  Alcohol withdrawal usually begins after prolonged heavy drinking, and then you suddenly stop drinking, or cut down on your alcohol use. It is not one thing, but is a complex combination of signs and symptoms that generally occur to together and define a particular problem or condition.    Alcohol withdrawal is potentially life-threatening, and is a medical emergency.    It can start as early as a couple of hours after your last  drink, or may take 1 to 3 days to develop.    It can last from days to a week or more.    It can worsen very quickly.  Signs and symptoms  There are several stages of alcohol withdrawal, although they overlap, as do their signs and symptoms. In the earlier stages, it most commonly includes:    Anxiety    Shakiness    Nausea and vomiting    Sweating    Insomnia    Headaches    Fever    Mood swings, irritability, agitation, restlessness  Delirium tremens (DTs)  DTs are a severe and life-threatening complication. If it happens, it usually begins about 3 to 5 days after your last drink. It is potentially life threatening. DTs are characterized by:    Sudden and severe mental or nervous system changes    Uncontrollable tremors    Severe disorientation, confusion, hallucinations    Heart racing, or irregular heartbeat    High blood pressure    Seizures    Possible coma and death  Home care    You will need plenty of rest and fluids over the next several days. Eat regular meals and drink plenty of fluids to prevent dehydration. Do not drink any more alcohol. During this time, it is best that you stay with family or friends who can help and support you. You can also admit yourself to a residential detox program.    Do not drive until all symptoms are gone and you are feeling better. if you've had a seizure, don't drive until you have been examined by a doctor.    If you were given sedative medication to reduce your symptoms, do not take it more often than prescribed and never take it with alcohol.  Follow-up care  Once you have gone through the withdrawal symptoms, you have fought half of the grewal. To avoid the risk of returning to your previous drinking pattern, it is essential that you get follow-up support and treatment.    Alcoholics Anonymous offers support through a self-help fellowship. There are no dues or fees. See the Yellow Pages and call for meeting times and places. www.aa.org    [facility] offers support to  families of alcohol users. 874.815.6104 www.al-anon.org    [employer] [place] On Alcoholism And Drug Dependence 729-933-8659 www.ncadd.org    Residential alcohol detox programs are available. Check the Yellow Pages under Drug Abuse & Treatment Centers.  Call 911  Call 911 if any of these occur:    Seizure    Trouble breathing or slow, irregular breathing    Chest pain    Sudden weakness on one side of the body or sudden trouble speaking    Heavy bleeding or vomiting blood    Very drowsy or trouble awakening    Fainting or loss of consciousness    Rapid heart rate  When to seek medical advice  Call your healthcare provider right away if any of these occur:    Severe shakiness    Hallucinations    Fever over 100.4  F (38.0  C)    Headache, confusion, extreme drowsiness, inability to awaken    Increasing upper abdominal pain    Repeated vomiting  Date Last Reviewed: 1/11/2016 2000-2017 [employer]. [address] [place] [address]. All rights reserved. This information is not intended as a substitute for professional medical care. Always follow your healthcare professional's instructions.

## 2017-12-22 NOTE — ED PROVIDER NOTES
History     Chief Complaint:  Possible seizure    HPI   Awais Best is a 22 year old male who presents to the emergency department today via EMS for evaluation of a possible seizure and is accompanied by his mother. The patient has a history of seizures, unsure if alcohol withdrawal or epilepsy per neurology, and has been drinking heavily throughout the semester until he returned from school 4 days ago. Today, he reports drinking 2-3 beers and taking a tablet of Xanax and 60 mg of Vyvanse around 1400 today. Later at home, the patient's mother reports hearing a thump upstairs and found the patient lying on his back on the ground thrashing around. Upon cessation of this, the patient was confused, disoriented, and incoherent. EMS were called and he had several episodes of vomiting since. Per EMS, the patient's heart rate has been between 130-150 with a glucose in the 150s. Here, the patient states that he does not remember falling and notes that he probably hit his head, as he has pain to the back of his head. He also reports current nausea, but no abdominal pain, neck pain, incontinence, or diarrhea. He was taking 500 mg Keppra BID over the summer, but discontinued it at school and resumed it this last week but has not taken any today. He also uses marijuana but has not used any today.    Allergies:  No Known Drug Allergies    Medications:    Keppra    Past Medical History:    Depression  Seizure    Past Surgical History:    History reviewed. No pertinent past surgical history.    Family History:    History reviewed. No pertinent family history.     Social History:  The patient was accompanied to the ED by EMS and his mother.  Smoking Status: Current Every Day Smoker  Smokeless Tobacco: Current user  Alcohol Use: Yes  Marital Status:  Single [1]    Review of Systems   Gastrointestinal: Positive for nausea and vomiting. Negative for abdominal pain and diarrhea.   Musculoskeletal: Negative for neck pain.  "  Neurological: Positive for headaches.   Psychiatric/Behavioral: Positive for confusion.   All other systems reviewed and are negative.    Physical Exam     Patient Vitals for the past 24 hrs:   BP Temp Temp src Heart Rate Resp SpO2 Height Weight   12/21/17 2000 118/68 - - 84 8 99 % - -   12/21/17 1830 - - - 112 17 98 % - -   12/21/17 1826 125/63 98.1  F (36.7  C) Oral 137 18 100 % 1.88 m (6' 2\") 68 kg (150 lb)   12/21/17 1824 125/63 - - 131 15 94 % - -     Physical Exam  Nursing note and vitals reviewed.  Constitutional:  Oriented to person, place, and time. Cooperative. Appears uncomfortable.  HENT:   Nose:    Nose normal.   Mouth/Throat:   Mucous membranes are normal. Tongue fasciculations present.  Eyes:    Conjunctivae normal and EOM are normal.      Pupils are equal, round, and reactive to light.   Neck:    Trachea normal.   Cardiovascular:  Tachycardic rate, regular rhythm, normal heart sounds and normal pulses. No murmur heard.  Pulmonary/Chest:  Effort normal and breath sounds normal.   Abdominal:   Soft. Normal appearance and bowel sounds are normal.      There is no tenderness.      There is no rebound and no CVA tenderness.   Musculoskeletal:  Extremities atraumatic x 4. No cervical spine tenderness to palpation.  Lymphadenopathy:  No cervical adenopathy.   Neurological:   Alert and oriented to person, place, and time. Normal strength. Slightly tremulous. No cranial nerve deficit or sensory deficit. GCS eye subscore is 4. GCS verbal subscore is 5. GCS motor subscore is 6.   Skin:    Skin is intact. No rash noted.   Psychiatric:   Normal mood and affect.    Emergency Department Course     ECG:  ECG taken at 1842, ECG read at 1845  Normal sinus rhythm  Incomplete right bundle branch block  Borderline ECG  Rate 95 bpm. CT interval 122. QRS duration 102. QT/QTc 358/449. P-R-T axes 66 82 71.    Imaging:  Radiology findings were communicated with the patient and family who voiced understanding of the " findings.    CT Head w/o contrast  Normal CT scan of the head. No change.  Reading per radiology    Laboratory:  Laboratory findings were communicated with the patient and family who voiced understanding of the findings.    CBC: AWNL. (WBC 9.2, HGB 17.1, )   CMP: Glucose 220 (H), CO2 15 (L), Anion gap 17 (H) o/w WNL (Creatinine 0.96)  Lipase: 108  Magnesium: 2.8 (H)  Alcohol ethyl: <0.01    UA with micro: Glucose 300 (A), Ketone 80 (A), Trace blood (A), Protein albumin 30 (A), Mucous present (A), Sperm present (A), Hyaline casts 5 (H) o/w negative  Drug abuse screen 77 urine: Positive for cannabinoids    Interventions:  1828 ns 1 L IV  1829 Zofran 4 mg IV  1845 Ativan 1 mg IV  2015 Keppra 500 mg IV    Emergency Department Course:  Nursing notes and vitals reviewed.  1831: I performed an exam of the patient as documented above.   IV was inserted and blood was drawn for laboratory testing, results above.  The patient was sent for a CT Head w/o contrast while in the emergency department, results above.   1954: I rechecked the patient and updated him and his mother on the results of laboratory and imaging studies.   1956: I spoke with Dr. Hodges of the neurology service from the [facility] regarding patient's presentation, findings, and plan of care.  2001: I rechecked the patient and discussed the treatment plan with him and his mother. They expressed understanding of this plan and consented to discharge. He will be discharged home with instructions for care and follow up. In addition, the patient will return to the emergency department if their symptoms worsen, if new symptoms arise or if there is any concern.  All questions were answered prior to discharge.    Impression & Plan      Medical Decision Making:  Awais Best is a 22 year old male brought in by EMS after he had what appears to be a seizure.  It is unclear if this was triggered by an alcohol withdrawal or possibly an  epileptic seizure or a combination thereof.  I felt it was reasonable to proceed with the above workup and provide IV fluids and Zofran as well as Ativan.  We also obtained a CT scan of the head to rule out an intracranial hemorrhage, given the fact that he hit his head.  This was also unremarkable.  He cleared significantly and is feeling much better.  I stressed the importance of avoiding alcohol going forward if at all possible.  His mother is in agreement.  He also understands that he cannot drive until he sees the neurologist and is cleared by the neurologist.  I spoke with the on-call neurologist, Dr. Hodges.  She actually remembers this individual and recommended that he increase his Keppra from 500 mg twice a day to a 1000 mg twice a day.  She also recommended providing him an IV dose of 500 mg here.  At this point though, I feel comfortable discharging him home with close outpatient follow-up.  He is to follow-up with Dr. Marmolejo at the [facility] on Monday, and they will need to call and schedule an appointment.    Diagnosis:    ICD-10-CM    1. Seizures (H) R56.9    2. Possible alcohol withdrawal syndrome with complication (H) F10.239      Disposition:   Home    Discharge Medications:  New Prescriptions    LEVETIRACETAM (KEPPRA) 500 MG TABLET    Take 2 tablets (1,000 mg) by mouth 2 times daily     Scribe Disclosure:  I, Na Pickens, am serving as a scribe at 6:31 PM on 12/21/2017 to document services personally performed by Armando Collins MD, based on my observations and the provider's statements to me.    12/21/2017    EMERGENCY DEPARTMENT       Armando Collins MD  12/22/17 [id number]